# Patient Record
Sex: FEMALE | Race: WHITE
[De-identification: names, ages, dates, MRNs, and addresses within clinical notes are randomized per-mention and may not be internally consistent; named-entity substitution may affect disease eponyms.]

---

## 2018-07-20 ENCOUNTER — HOSPITAL ENCOUNTER (EMERGENCY)
Dept: HOSPITAL 26 - MED | Age: 48
Discharge: HOME | End: 2018-07-20
Payer: COMMERCIAL

## 2018-07-20 VITALS — SYSTOLIC BLOOD PRESSURE: 122 MMHG | DIASTOLIC BLOOD PRESSURE: 78 MMHG

## 2018-07-20 VITALS
BODY MASS INDEX: 27.6 KG/M2 | SYSTOLIC BLOOD PRESSURE: 128 MMHG | HEIGHT: 62 IN | WEIGHT: 150 LBS | DIASTOLIC BLOOD PRESSURE: 80 MMHG
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
## 2018-07-20 DIAGNOSIS — S13.4XXA: Primary | ICD-10-CM
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
## 2018-07-20 DIAGNOSIS — E03.9: ICD-10-CM

## 2018-07-20 PROCEDURE — 72040 X-RAY EXAM NECK SPINE 2-3 VW: CPT

## 2018-07-20 PROCEDURE — 99284 EMERGENCY DEPT VISIT MOD MDM: CPT

## 2018-07-20 PROCEDURE — 96372 THER/PROPH/DIAG INJ SC/IM: CPT
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
## 2018-07-20 NOTE — NUR
Patient discharged with v/s stable. Written and verbal after care instructions 
given and explained. Patient alert, oriented and verbalized understanding of 
instructions. Ambulatory with steady gait. All questions addressed prior to 
discharge. ID band removed. Patient advised to follow up with PMD. Rx of Motrin 
given. Patient educated on indication of medication including possible reaction 
and side effects. Opportunity to ask questions provided and answered.